# Patient Record
Sex: FEMALE | Race: WHITE | NOT HISPANIC OR LATINO | URBAN - NONMETROPOLITAN AREA
[De-identification: names, ages, dates, MRNs, and addresses within clinical notes are randomized per-mention and may not be internally consistent; named-entity substitution may affect disease eponyms.]

---

## 2025-03-27 ENCOUNTER — APPOINTMENT (OUTPATIENT)
Dept: PRIMARY CARE | Facility: CLINIC | Age: 30
End: 2025-03-27
Payer: MEDICAID

## 2025-04-01 PROBLEM — R59.1 LYMPHADENOPATHY: Status: ACTIVE | Noted: 2025-04-01

## 2025-04-01 PROBLEM — F32.A DEPRESSION: Status: ACTIVE | Noted: 2022-06-09

## 2025-04-01 PROBLEM — R76.8 POSITIVE ANA (ANTINUCLEAR ANTIBODY): Status: ACTIVE | Noted: 2025-04-01

## 2025-04-01 RX ORDER — DESOGESTREL AND ETHINYL ESTRADIOL 0.15-0.03
1 KIT ORAL
COMMUNITY
Start: 2023-06-02 | End: 2025-04-02 | Stop reason: ALTCHOICE

## 2025-04-02 ENCOUNTER — APPOINTMENT (OUTPATIENT)
Dept: PRIMARY CARE | Facility: CLINIC | Age: 30
End: 2025-04-02
Payer: MEDICAID

## 2025-04-02 VITALS
WEIGHT: 151 LBS | RESPIRATION RATE: 18 BRPM | BODY MASS INDEX: 27.79 KG/M2 | HEIGHT: 62 IN | DIASTOLIC BLOOD PRESSURE: 78 MMHG | SYSTOLIC BLOOD PRESSURE: 118 MMHG | HEART RATE: 66 BPM | OXYGEN SATURATION: 99 %

## 2025-04-02 DIAGNOSIS — Z00.00 ANNUAL PHYSICAL EXAM: Primary | ICD-10-CM

## 2025-04-02 DIAGNOSIS — Z13.6 SCREENING FOR CARDIOVASCULAR CONDITION: ICD-10-CM

## 2025-04-02 DIAGNOSIS — N92.6 MISSED PERIOD: ICD-10-CM

## 2025-04-02 DIAGNOSIS — B35.0 TINEA CAPITIS: ICD-10-CM

## 2025-04-02 DIAGNOSIS — L68.0 HIRSUTISM: ICD-10-CM

## 2025-04-02 PROBLEM — F32.A DEPRESSION: Status: RESOLVED | Noted: 2022-06-09 | Resolved: 2025-04-02

## 2025-04-02 PROBLEM — R59.1 LYMPHADENOPATHY: Status: RESOLVED | Noted: 2025-04-01 | Resolved: 2025-04-02

## 2025-04-02 PROCEDURE — 3008F BODY MASS INDEX DOCD: CPT | Performed by: NURSE PRACTITIONER

## 2025-04-02 PROCEDURE — 99204 OFFICE O/P NEW MOD 45 MIN: CPT | Performed by: NURSE PRACTITIONER

## 2025-04-02 PROCEDURE — 1036F TOBACCO NON-USER: CPT | Performed by: NURSE PRACTITIONER

## 2025-04-02 PROCEDURE — 99385 PREV VISIT NEW AGE 18-39: CPT | Performed by: NURSE PRACTITIONER

## 2025-04-02 RX ORDER — ELECTROLYTES/DEXTROSE
5 SOLUTION, ORAL ORAL DAILY
COMMUNITY

## 2025-04-02 ASSESSMENT — COLUMBIA-SUICIDE SEVERITY RATING SCALE - C-SSRS
1. IN THE PAST MONTH, HAVE YOU WISHED YOU WERE DEAD OR WISHED YOU COULD GO TO SLEEP AND NOT WAKE UP?: NO
2. HAVE YOU ACTUALLY HAD ANY THOUGHTS OF KILLING YOURSELF?: NO
6. HAVE YOU EVER DONE ANYTHING, STARTED TO DO ANYTHING, OR PREPARED TO DO ANYTHING TO END YOUR LIFE?: NO

## 2025-04-02 ASSESSMENT — PATIENT HEALTH QUESTIONNAIRE - PHQ9
2. FEELING DOWN, DEPRESSED OR HOPELESS: NOT AT ALL
SUM OF ALL RESPONSES TO PHQ9 QUESTIONS 1 AND 2: 0
1. LITTLE INTEREST OR PLEASURE IN DOING THINGS: NOT AT ALL

## 2025-04-02 NOTE — PROGRESS NOTES
Amy Mata is a 29 y.o. female who is presenting for annual physical exam and has a couple concerns.     She states that her boyfriend told her about a bald spot to the back of her head about 2 months ago. He did say he noticed it in 2024 but didn't initially say anything. When this was first pointed out, she noticed some itching. She also notices a similar small bald spot to the front of her scalp. She started taking biotin about a week and a half ago. She has not noticed any significant hair shedding.     She also has noticed some hair growth to her chin over the last couple years. She states her periods are normal, about once every 30-35 days. Her LMP was , her fred is saying she is 6 days late. Mentions she did take Plan B this past cycle. Has not taken a pregnancy test.     Last  Visit: Within the last couple years  Reported Health: Good    Dental, Vision, Hearing:  Regular dental visits: yes  - Brushes teeth 2 times per day  Vision problems: yes  - Wears glasses or contacts? yes, glasses  - Last eye exam:  about 3 years ago  Hearing loss: no    Immunization status:  Up to date: no, needs Tdap    Lifestyle:  Healthy diet: yes  Regular exercise: yes  - Exercise frequency: 3-4 days per week  - Type of exercise: cardio  Alcohol: yes, socially   Tobacco: no  Drugs: no    Reproductive Health:  Menstrual problems: no  - LMP:    Sexually active: yes, 1 male partner  Contraception: usually condoms    Pregnancy History:   : 0    Cervical Cancer Screening:  Last pap:  2023  History of abnormal pap smear? no  Breast Cancer Screening:  Last mammogram:  n/a  Colorectal Cancer Screening:  Last colonoscopy or Cologuard:  n/a  Metabolic Screening:  Lipid profile:   Glucose screen:     Review of Systems  Gen: denies fever, chills, weight loss, fatigue  HEENT: denies sinus pressure, sinus congestion, runny nose, red eyes, itchy eyes, vision loss, ear pain, hearing loss, throat pain,  trouble swallowing  Neck: denies neck pain, neck swelling or masses  Chest/breast: denies breast pain, breast lumps, nipple discharge  CV: denies chest pain, palpitations, fast heart rate, syncope  Resp: denies shortness of breath, cough, wheezing  GI: denies abdominal pain, nausea, diarrhea, constipation, hematochezia, melena  : denies dysuria, hematuria, vaginal discharge, frequency  Endo: denies polydipsia, polyuria, heat/cold intolerance, weight change, hair thinning  Heme: denies easy bruising, easy bleeding  Neuro: denies headache, numbness, tingling, memory loss, changes in vision  MSK: denies joint pain, joint swelling, weakness  Psych: denies suicidal ideation, homicidal ideation, depression, anxiety, mood swings  Skin: as noted in HPI. denies rashes, abnormal lesions, changes in moles     Previous History  Past Medical History:   Diagnosis Date    Depression 06/09/2022    Laceration without foreign body of left forearm, initial encounter 10/21/2014    Laceration of forearm, left    Laceration without foreign body of left hand, initial encounter 10/21/2014    Laceration of hand, left    Local infection of the skin and subcutaneous tissue, unspecified 08/20/2015    Pustule    Noninfective gastroenteritis and colitis, unspecified 12/26/2017    Acute gastroenteritis    Pain in left hand 04/22/2015    Hand pain, left    Palpitations 01/28/2016    Heart palpitations    Personal history of other diseases of the respiratory system 12/26/2017    History of acute sinusitis    Personal history of other mental and behavioral disorders 10/10/2014    History of depression    Superficial foreign body of left hand, initial encounter 04/22/2015    Foreign body of hand, left     Past Surgical History:   Procedure Laterality Date    HAND SURGERY      TONSILLECTOMY  02/13/2014    Tonsillectomy     Social History     Tobacco Use    Smoking status: Never    Smokeless tobacco: Never   Vaping Use    Vaping status: Former     "Substances: Nicotine   Substance Use Topics    Alcohol use: Yes     Comment: socially    Drug use: Never     Family History   Problem Relation Name Age of Onset    No Known Problems Mother      Hypertension Father      Osteogenesis imperfecta Father      Osteogenesis imperfecta Sister      Migraines Sister      Lung cancer Maternal Grandfather      Alcohol abuse Paternal Grandmother      Osteogenesis imperfecta Paternal Grandfather       No Known Allergies  Current Outpatient Medications   Medication Instructions    biotin 5 mg, Daily     Immunization History   Administered Date(s) Administered    DTP 1995, 01/02/1996, 02/20/1996, 02/18/1997, 09/18/2000    Hep A, Unspecified 08/05/2009, 03/09/2010    Hepatitis A vaccine, pediatric/adolescent (HAVRIX, VAQTA) 08/05/2009, 03/09/2010    Hepatitis B vaccine, 19 yrs and under (RECOMBIVAX, ENGERIX) 1995, 1995, 02/20/1996    Hepatitis B vaccine, adult *Check Product/Dose* 1995, 1995, 02/20/1996    HiB PRP-OMP conjugate vaccine, pediatric (PEDVAXHIB) 1995, 01/02/1996, 02/20/1996, 08/21/1996    HiB, unspecified 1995, 01/02/1996, 02/20/1996, 08/21/1996    MMR vaccine, subcutaneous (MMR II) 08/21/1996, 09/18/2000    Meningococcal ACWY, unspecified 02/24/2009    Meningococcal ACWY-D (Menactra) 4-valent conjugate vaccine 02/24/2009    Polio, Unspecified 1995, 01/02/1996, 02/20/1996, 09/18/2000    Poliovirus vaccine, subcutaneous (IPOL) 1995, 01/02/1996, 02/20/1996, 09/18/2000    Tdap vaccine, age 7 year and older (BOOSTRIX, ADACEL) 09/05/2012       Visit Vitals  /78 (BP Location: Left arm, Patient Position: Sitting, BP Cuff Size: Adult)   Pulse 66   Resp 18   Ht 1.575 m (5' 2\")   Wt 68.5 kg (151 lb)   SpO2 99%   BMI 27.62 kg/m²   OB Status Having periods   Smoking Status Never   BSA 1.73 m²        Physical Exam  General: Alert and oriented, in no acute distress. Appears stated age, well-nourished, and well " hydrated  HEENT:  - Head: Normocephalic and atraumatic   - Eyes: EOMI, PERRLA  - ENT: Hearing grossly intact. Mucus membranes pink and moist without lesions. Tonsils present without swelling or exudates. Good dentition. TMs gray  Neck: Supple. No stiffness. No thyromegaly or thyroid nodules  Heart: RRR. No murmurs, clicks, or rubs  Lungs: Unlabored breathing. CTAB with no crackles, wheezes, or rhonchi  Abdomen: Normal BS in all 4 quadrants. Soft, non-tender, non-distended, with no masses  Extremities: Warm and well perfused. No edema. Normal peripheral pulses  Musculoskeletal: ROM intact. Strength 5/5 in BUE and BLE. No joint swelling. Normal gait and station  Neurological: Alert and oriented. No gross neurological deficits. Normal sensation. No weakness. DTRs +2/4   Psychological: Appropriate mood and affect  Skin: Scaly patch with alopecia to both parietal and frontal regions of scalp    Assessment and Plan     Amy was seen today for new patient visit and hair/scalp problem.  Diagnoses and all orders for this visit:    Annual physical exam (Primary)  - Due for Tdap vaccine; will need to get at pharmacy or health department with her current insurance   - Screening for cardiovascular condition  -     Comprehensive metabolic panel; Future  -     CBC; Future  -     Lipid Panel; Future  - Due for pap with co-testing when she turns 30  - Maintain health lifestyle   Tinea capitis  - Check LFTs  - Pending LFTs, will prescribe terbinafine 250 mg daily for 6 weeks (will check LFTs after 4 weeks)  - Referral to Dermatology  Missed period  - Cancel: POCT Pregnancy, Urine manually resulted (patient unable to void)  - Advised to take home pregnancy test  Hirsutism  - Tsh With Reflex To Free T4 If Abnormal; Future (advised to stop biotin for at least 3 days before testing)  - Testosterone, total and free; Future  - Cortisol AM; Future  - Prolactin level; Future  - FSH; Future  - 17-Hydroxyprogesterone; Future  - Referral to  Dermatology    FU in 1 year or sooner prmable Pyle, APRN-CNP  [facility]

## 2025-06-07 LAB
17OHP SERPL-MCNC: NORMAL NG/DL
ALBUMIN SERPL-MCNC: 4.5 G/DL (ref 3.6–5.1)
ALP SERPL-CCNC: 59 U/L (ref 31–125)
ALT SERPL-CCNC: 11 U/L (ref 6–29)
ANION GAP SERPL CALCULATED.4IONS-SCNC: 10 MMOL/L (CALC) (ref 7–17)
AST SERPL-CCNC: 17 U/L (ref 10–30)
BILIRUB SERPL-MCNC: 1.1 MG/DL (ref 0.2–1.2)
BUN SERPL-MCNC: 13 MG/DL (ref 7–25)
CALCIUM SERPL-MCNC: 9.4 MG/DL (ref 8.6–10.2)
CHLORIDE SERPL-SCNC: 105 MMOL/L (ref 98–110)
CHOLEST SERPL-MCNC: 190 MG/DL
CHOLEST/HDLC SERPL: 2.8 (CALC)
CO2 SERPL-SCNC: 24 MMOL/L (ref 20–32)
CORTIS AM PEAK SERPL-MCNC: 24.4 MCG/DL
CREAT SERPL-MCNC: 0.71 MG/DL (ref 0.5–0.96)
EGFRCR SERPLBLD CKD-EPI 2021: 118 ML/MIN/1.73M2
ERYTHROCYTE [DISTWIDTH] IN BLOOD BY AUTOMATED COUNT: 12.8 % (ref 11–15)
FSH SERPL-ACNC: 2.5 MIU/ML
GLUCOSE SERPL-MCNC: 95 MG/DL (ref 65–99)
HCT VFR BLD AUTO: 45.5 % (ref 35–45)
HDLC SERPL-MCNC: 69 MG/DL
HGB BLD-MCNC: 14.5 G/DL (ref 11.7–15.5)
LDLC SERPL CALC-MCNC: 95 MG/DL (CALC)
MCH RBC QN AUTO: 30.9 PG (ref 27–33)
MCHC RBC AUTO-ENTMCNC: 31.9 G/DL (ref 32–36)
MCV RBC AUTO: 96.8 FL (ref 80–100)
NONHDLC SERPL-MCNC: 121 MG/DL (CALC)
PLATELET # BLD AUTO: 225 THOUSAND/UL (ref 140–400)
PMV BLD REES-ECKER: 9.1 FL (ref 7.5–12.5)
POTASSIUM SERPL-SCNC: 4.4 MMOL/L (ref 3.5–5.3)
PROLACTIN SERPL-MCNC: 34.4 NG/ML
PROT SERPL-MCNC: 6.9 G/DL (ref 6.1–8.1)
RBC # BLD AUTO: 4.7 MILLION/UL (ref 3.8–5.1)
SODIUM SERPL-SCNC: 139 MMOL/L (ref 135–146)
TESTOST FREE SERPL-MCNC: NORMAL PG/ML
TESTOST SERPL-MCNC: NORMAL NG/DL
TRIGL SERPL-MCNC: 154 MG/DL
TSH SERPL-ACNC: 4.36 MIU/L
WBC # BLD AUTO: 6.3 THOUSAND/UL (ref 3.8–10.8)

## 2025-06-13 DIAGNOSIS — R79.89 ELEVATED CORTISOL LEVEL: ICD-10-CM

## 2025-06-13 DIAGNOSIS — R79.89 ELEVATED PROLACTIN LEVEL: Primary | ICD-10-CM

## 2025-06-13 LAB
17OHP SERPL-MCNC: 231 NG/DL
ALBUMIN SERPL-MCNC: 4.5 G/DL (ref 3.6–5.1)
ALP SERPL-CCNC: 59 U/L (ref 31–125)
ALT SERPL-CCNC: 11 U/L (ref 6–29)
ANION GAP SERPL CALCULATED.4IONS-SCNC: 10 MMOL/L (CALC) (ref 7–17)
AST SERPL-CCNC: 17 U/L (ref 10–30)
BILIRUB SERPL-MCNC: 1.1 MG/DL (ref 0.2–1.2)
BUN SERPL-MCNC: 13 MG/DL (ref 7–25)
CALCIUM SERPL-MCNC: 9.4 MG/DL (ref 8.6–10.2)
CHLORIDE SERPL-SCNC: 105 MMOL/L (ref 98–110)
CHOLEST SERPL-MCNC: 190 MG/DL
CHOLEST/HDLC SERPL: 2.8 (CALC)
CO2 SERPL-SCNC: 24 MMOL/L (ref 20–32)
CORTIS AM PEAK SERPL-MCNC: 24.4 MCG/DL
CREAT SERPL-MCNC: 0.71 MG/DL (ref 0.5–0.96)
EGFRCR SERPLBLD CKD-EPI 2021: 118 ML/MIN/1.73M2
ERYTHROCYTE [DISTWIDTH] IN BLOOD BY AUTOMATED COUNT: 12.8 % (ref 11–15)
FSH SERPL-ACNC: 2.5 MIU/ML
GLUCOSE SERPL-MCNC: 95 MG/DL (ref 65–99)
HCT VFR BLD AUTO: 45.5 % (ref 35–45)
HDLC SERPL-MCNC: 69 MG/DL
HGB BLD-MCNC: 14.5 G/DL (ref 11.7–15.5)
LDLC SERPL CALC-MCNC: 95 MG/DL (CALC)
MCH RBC QN AUTO: 30.9 PG (ref 27–33)
MCHC RBC AUTO-ENTMCNC: 31.9 G/DL (ref 32–36)
MCV RBC AUTO: 96.8 FL (ref 80–100)
NONHDLC SERPL-MCNC: 121 MG/DL (CALC)
PLATELET # BLD AUTO: 225 THOUSAND/UL (ref 140–400)
PMV BLD REES-ECKER: 9.1 FL (ref 7.5–12.5)
POTASSIUM SERPL-SCNC: 4.4 MMOL/L (ref 3.5–5.3)
PROLACTIN SERPL-MCNC: 34.4 NG/ML
PROT SERPL-MCNC: 6.9 G/DL (ref 6.1–8.1)
RBC # BLD AUTO: 4.7 MILLION/UL (ref 3.8–5.1)
SODIUM SERPL-SCNC: 139 MMOL/L (ref 135–146)
TESTOST FREE SERPL-MCNC: 3.1 PG/ML (ref 0.1–6.4)
TESTOST SERPL-MCNC: 27 NG/DL (ref 2–45)
TRIGL SERPL-MCNC: 154 MG/DL
TSH SERPL-ACNC: 4.36 MIU/L
WBC # BLD AUTO: 6.3 THOUSAND/UL (ref 3.8–10.8)

## 2025-06-13 RX ORDER — DEXAMETHASONE 1 MG/1
1 TABLET ORAL ONCE
Qty: 1 TABLET | Refills: 0 | Status: SHIPPED | OUTPATIENT
Start: 2025-06-13 | End: 2025-06-13

## 2025-06-20 LAB
CORTIS AM PEAK SERPL-MCNC: 0.7 MCG/DL
PROLACTIN SERPL-MCNC: 4.7 NG/ML